# Patient Record
Sex: FEMALE | URBAN - METROPOLITAN AREA
[De-identification: names, ages, dates, MRNs, and addresses within clinical notes are randomized per-mention and may not be internally consistent; named-entity substitution may affect disease eponyms.]

---

## 2018-03-06 ENCOUNTER — APPOINTMENT (RX ONLY)
Dept: URBAN - METROPOLITAN AREA CLINIC 158 | Facility: CLINIC | Age: 78
Setting detail: DERMATOLOGY
End: 2018-03-06

## 2018-03-06 DIAGNOSIS — L28.1 PRURIGO NODULARIS: ICD-10-CM

## 2018-03-06 DIAGNOSIS — L20.89 OTHER ATOPIC DERMATITIS: ICD-10-CM | Status: INADEQUATELY CONTROLLED

## 2018-03-06 PROBLEM — I10 ESSENTIAL (PRIMARY) HYPERTENSION: Status: ACTIVE | Noted: 2018-03-06

## 2018-03-06 PROBLEM — L20.84 INTRINSIC (ALLERGIC) ECZEMA: Status: ACTIVE | Noted: 2018-03-06

## 2018-03-06 PROBLEM — L40.0 PSORIASIS VULGARIS: Status: ACTIVE | Noted: 2018-03-06

## 2018-03-06 PROBLEM — E78.5 HYPERLIPIDEMIA, UNSPECIFIED: Status: ACTIVE | Noted: 2018-03-06

## 2018-03-06 PROCEDURE — ? PRESCRIPTION

## 2018-03-06 PROCEDURE — ? COUNSELING

## 2018-03-06 PROCEDURE — ? TREATMENT REGIMEN

## 2018-03-06 PROCEDURE — 99202 OFFICE O/P NEW SF 15 MIN: CPT

## 2018-03-06 RX ORDER — BETAMETHASONE DIPROPIONATE 0.5 MG/G
APPLY CREAM TOPICAL BID
Qty: 50 | Refills: 5 | Status: ERX | COMMUNITY
Start: 2018-03-06

## 2018-03-06 RX ADMIN — BETAMETHASONE DIPROPIONATE APPLY: 0.5 CREAM TOPICAL at 16:18

## 2018-03-06 ASSESSMENT — LOCATION SIMPLE DESCRIPTION DERM
LOCATION SIMPLE: LEFT PRETIBIAL REGION
LOCATION SIMPLE: RIGHT PRETIBIAL REGION

## 2018-03-06 ASSESSMENT — LOCATION DETAILED DESCRIPTION DERM
LOCATION DETAILED: RIGHT DISTAL PRETIBIAL REGION
LOCATION DETAILED: LEFT PROXIMAL PRETIBIAL REGION
LOCATION DETAILED: LEFT DISTAL PRETIBIAL REGION

## 2018-03-06 ASSESSMENT — LOCATION ZONE DERM: LOCATION ZONE: LEG

## 2018-03-06 NOTE — PROCEDURE: TREATMENT REGIMEN
Continue Regimen: Benadryl QHS (pt has at home)
Discontinue Regimen: TAC 0.1% (given by another MD)
Detail Level: Zone
Plan: PT states she is currently taking benadryl and she is not current itching so she declined another oral antihistamines. If pt calls oral antihistamines can be sent in for her. Pt to f/u as needed
Initiate Treatment: augmented betamethasone cream 0.05% bid